# Patient Record
Sex: FEMALE | Race: WHITE | NOT HISPANIC OR LATINO | Employment: PART TIME | URBAN - METROPOLITAN AREA
[De-identification: names, ages, dates, MRNs, and addresses within clinical notes are randomized per-mention and may not be internally consistent; named-entity substitution may affect disease eponyms.]

---

## 2022-09-15 NOTE — PATIENT INSTRUCTIONS
- Maintain healthy weight with BMI ideally between 18-25     - Eat a healthy diet, including multiple servings of vegetables and fruits, as well as lean protein sources  - Get at least 150 minutes of moderate aerobic activity or 75 minutes of vigorous aerobic activity a week, or a combination of moderate and vigorous activity  Greater amounts of exercise will provide an even greater health benefit  - Ensure diet provides 1200mg of Calcium daily (divided) and 800IU of vitamin D, or take supplements to meet this  - Safe sex practices recommended  - Resources - information on birth control and sexually transmitted infections - www bedsider  org            - information on sexually transmitted infections - www cdc gov/std/     Medications for pain with intercourse   - Estrace vaginal cream   - Vagifem   - Estring    - Premarin vaginal cream

## 2022-09-16 ENCOUNTER — ANNUAL EXAM (OUTPATIENT)
Dept: OBGYN CLINIC | Facility: CLINIC | Age: 67
End: 2022-09-16

## 2022-09-16 VITALS
SYSTOLIC BLOOD PRESSURE: 100 MMHG | HEIGHT: 61 IN | WEIGHT: 117 LBS | DIASTOLIC BLOOD PRESSURE: 58 MMHG | BODY MASS INDEX: 22.09 KG/M2

## 2022-09-16 DIAGNOSIS — Z12.31 BREAST CANCER SCREENING BY MAMMOGRAM: ICD-10-CM

## 2022-09-16 DIAGNOSIS — N94.10 DYSPAREUNIA IN FEMALE: ICD-10-CM

## 2022-09-16 DIAGNOSIS — Z13.820 SCREENING FOR OSTEOPOROSIS: ICD-10-CM

## 2022-09-16 DIAGNOSIS — E28.39 ESTROGEN DEFICIENCY: ICD-10-CM

## 2022-09-16 DIAGNOSIS — N83.201 RIGHT OVARIAN CYST: ICD-10-CM

## 2022-09-16 DIAGNOSIS — Z01.419 ENCOUNTER FOR ANNUAL ROUTINE GYNECOLOGICAL EXAMINATION: Primary | ICD-10-CM

## 2022-09-16 RX ORDER — LAMOTRIGINE 100 MG/1
TABLET ORAL
COMMUNITY
Start: 2022-08-07

## 2022-09-16 NOTE — LETTER
[id number]     Richard Ponce, [address] [facility] [address]    Patient: Sonya Mayberry   YOB: 1955   Date of Visit: 2022       Dear Dr Barbour Never: Thank you for referring Sonya Mayberry to me for evaluation  Below are my notes for this consultation  If you have questions, please do not hesitate to call me  I look forward to following your patient along with you  Sincerely,        Starr Sims MD        CC: No Recipients  Starr Sims MD  2022  2:42 PM  Sign when Signing Visit  Medicare [address]  [address] [facility], [address], [place]    ASSESSMENT/PLAN: Sonya Mayberry is a 74 y o   who presents for [employer] gynecologic wellness exam     Encounter for routine gynecologic examination  - Routine well woman exam completed today  - Cervical Cancer Screening last 2020, discussed considering stopping - pt to consider   - Breast Cancer Screening: Last Mammogram 10/2019 normal  - Colorectal cancer screening last  per pt, next due   - Osteoporosis screening: last  - order provided  - The following were reviewed in today's visit: {Gyn counselin}  Discussed with patient Medicare (and plans that act like Medicare) pay for wellness visit q 2 yrs - but patient may return for problems as needed  Recommend annual breast exam and mammogram   If not seen by our office on her off year, she can still call and ask for a screening mammogram order and the nurses will provide one for her  Additional problems addressed during this visit:  1  Encounter for annual routine gynecological examination    2  Breast cancer screening by mammogram  -     Mammo screening bilateral w 3d & cad; Future    3  Screening for osteoporosis  Comments:  Encouraged patient to check insurance for coverage of dexa scan  Orders:  -     DXA bone density spine hip and pelvis; Future    4   Estrogen deficiency  -     DXA bone density spine hip and pelvis; Future    5  Right ovarian cyst  Assessment & Plan:  H/o simple ovarian cyst in past   Last noted  was stable and pt asx  She continues to have no sx and declines order to f/u imaging today  6  Dyspareunia in female  Assessment & Plan:  Long h/o pain with intercourse due to atrophy, but vaginal estrogen too expensive  Has new insurance now  Gave list of options to check against formulary and call me if wishes to start any  Next visit: 2 year Medicare Wellness      CC:  Medicare Gynecologic Wellness Examination    HPI: Cheikh Street is a 74 y o   who presents for [facility] gynecologic examination  She denies any breast, urinary or pelvic issues at today's visit  Sexually active, some discomfort with intercourse but vaginal estrogen has always been too expensive  Gyn History       She  reports being sexually active and has had partner(s) who are male  She reports using the following method of birth control/protection: Post-menopausal        Past Medical History:  2011: Seizures ([facility] [address] )      Comment:  on medication, following with neurology     Past Surgical History:  : FOOT NEUROMA SURGERY;  Right  2009: HERNIA REPAIR  1963: TONSILLECTOMY     Family History   Problem Relation Age of Onset    Substance Abuse Mother     Hypertension Father     Atrial fibrillation Father     Heart murmur Brother     Emphysema Maternal Grandmother     Heart disease Maternal Grandfather     Diabetes Maternal Grandfather     Melanoma Paternal Grandmother     Alzheimer's disease Paternal Grandfather     No Known Problems Daughter     Breast cancer Neg Hx         Social History     Tobacco Use    Smoking status: Never Smoker    Smokeless tobacco: Never Used   Vaping Use    Vaping Use: Never used   Substance Use Topics    Alcohol use: Yes     Comment: rare    Drug use: Never          Current Outpatient Medications:     Calcium Carb-Cholecalciferol 1000-800 MG-UNIT TABS, Take by mouth, Disp: , Rfl:     lamoTRIgine (LaMICtal) 100 mg tablet, TAKE ONE HALF TABLET BY MOUTH IN THE MORNING AND ONE HALF TABLET BY MOUTH IN THE EVENING , Disp: , Rfl:     She has No Known Allergies       ROS negative except as noted in HPI    Objective:  /58 (BP Location: Left arm, Patient Position: Sitting, Cuff Size: Standard)   Ht 5' 0 5" (1 537 m)   Wt 53 1 kg (117 lb)   Breastfeeding No   BMI 22 47 kg/m²      Physical Exam  Constitutional:       Appearance: Normal appearance  Chest:   Breasts:      Right: Normal  No mass, tenderness or axillary adenopathy  Left: Normal  No mass, tenderness or axillary adenopathy  Abdominal:      Palpations: Abdomen is soft  Tenderness: There is no abdominal tenderness  Genitourinary:     General: Normal vulva  Vagina: No bleeding or lesions  Cervix: Normal       Uterus: Normal  Not tender  Adnexa:         Right: No mass or tenderness  Left: No mass or tenderness  Rectum: No mass or external hemorrhoid  Normal anal tone  Comments: Atrophic changes appropriate to age  Musculoskeletal:         General: Normal range of motion  Lymphadenopathy:      Upper Body:      Right upper body: No axillary adenopathy  Left upper body: No axillary adenopathy  Neurological:      Mental Status: She is alert and oriented to person, place, and time     Psychiatric:         Mood and Affect: Mood normal          Behavior: Behavior normal

## 2022-09-16 NOTE — LETTER
[id number]     Jeff Rice, [address] [facility] [address]    Patient: Dontrell Cardozo   YOB: 1955   Date of Visit: 2022       Dear Dr Dianelys Giang: Thank you for referring Dontrell Cardozo to me for evaluation  Below are my notes for this consultation  If you have questions, please do not hesitate to call me  I look forward to following your patient along with you  Sincerely,        Rob Decker MD        CC: No Recipients  Rob Decker MD  2022  2:43 PM  Sign when Signing Visit  Medicare [address]  [address] [facility], [address], [facility], [facility]    ASSESSMENT/PLAN: Dontrell Cardozo is a 74 y o   who presents for [employer] gynecologic wellness exam     Encounter for routine gynecologic examination  - Routine well woman exam completed today  - Cervical Cancer Screening last 2020, discussed considering stopping - pt to consider   - Breast Cancer Screening: Last Mammogram 10/2019 normal  - Colorectal cancer screening last  per pt, next due   - Osteoporosis screening: last  - order provided  - The following were reviewed in today's visit: mammography screening ordered, menopause, adequate intake of calcium and vitamin D, exercise and healthy diet  Discussed with patient Medicare (and plans that act like Medicare) pay for wellness visit q 2 yrs - but patient may return for problems as needed  Recommend annual breast exam and mammogram   If not seen by our office on her off year, she can still call and ask for a screening mammogram order and the nurses will provide one for her  Additional problems addressed during this visit:  1  Encounter for annual routine gynecological examination    2  Breast cancer screening by mammogram  -     Mammo screening bilateral w 3d & cad; Future    3  Screening for osteoporosis  Comments:  Encouraged patient to check insurance for coverage of dexa scan    Orders:  - DXA bone density spine hip and pelvis; Future    4  Estrogen deficiency  -     DXA bone density spine hip and pelvis; Future    5  Right ovarian cyst  Assessment & Plan:  H/o simple ovarian cyst in past   Last noted  was stable and pt asx  She continues to have no sx and declines order to f/u imaging today  6  Dyspareunia in female  Assessment & Plan:  Long h/o pain with intercourse due to atrophy, but vaginal estrogen too expensive  Has new insurance now  Gave list of options to check against formulary and call me if wishes to start any  Next visit: 2 year Medicare Wellness      CC:  Medicare Gynecologic Wellness Examination    HPI: Sonya Mayberry is a 74 y o   who presents for [facility] gynecologic examination  She denies any breast, urinary or pelvic issues at today's visit  Sexually active, some discomfort with intercourse but vaginal estrogen has always been too expensive  Gyn History       She  reports being sexually active and has had partner(s) who are male  She reports using the following method of birth control/protection: Post-menopausal        Past Medical History:  2011: Seizures ([facility] [address] )      Comment:  on medication, following with neurology     Past Surgical History:  : FOOT NEUROMA SURGERY;  Right  2009: HERNIA REPAIR  1963: TONSILLECTOMY     Family History   Problem Relation Age of Onset    Substance Abuse Mother     Hypertension Father     Atrial fibrillation Father     Heart murmur Brother     Emphysema Maternal Grandmother     Heart disease Maternal Grandfather     Diabetes Maternal Grandfather     Melanoma Paternal Grandmother     Alzheimer's disease Paternal Grandfather     No Known Problems Daughter     Breast cancer Neg Hx         Social History     Tobacco Use    Smoking status: Never Smoker    Smokeless tobacco: Never Used   Vaping Use    Vaping Use: Never used   Substance Use Topics    Alcohol use: Yes     Comment: rare    Drug use: Never Current Outpatient Medications:     Calcium Carb-Cholecalciferol 1000-800 MG-UNIT TABS, Take by mouth, Disp: , Rfl:     lamoTRIgine (LaMICtal) 100 mg tablet, TAKE ONE HALF TABLET BY MOUTH IN THE MORNING AND ONE HALF TABLET BY MOUTH IN THE EVENING , Disp: , Rfl:     She has No Known Allergies       ROS negative except as noted in HPI    Objective:  /58 (BP Location: Left arm, Patient Position: Sitting, Cuff Size: Standard)   Ht 5' 0 5" (1 537 m)   Wt 53 1 kg (117 lb)   Breastfeeding No   BMI 22 47 kg/m²      Physical Exam  Constitutional:       Appearance: Normal appearance  Chest:   Breasts:      Right: Normal  No mass, tenderness or axillary adenopathy  Left: Normal  No mass, tenderness or axillary adenopathy  Abdominal:      Palpations: Abdomen is soft  Tenderness: There is no abdominal tenderness  Genitourinary:     General: Normal vulva  Vagina: No bleeding or lesions  Cervix: Normal       Uterus: Normal  Not tender  Adnexa:         Right: No mass or tenderness  Left: No mass or tenderness  Rectum: No mass or external hemorrhoid  Normal anal tone  Comments: Atrophic changes appropriate to age  Musculoskeletal:         General: Normal range of motion  Lymphadenopathy:      Upper Body:      Right upper body: No axillary adenopathy  Left upper body: No axillary adenopathy  Neurological:      Mental Status: She is alert and oriented to person, place, and time     Psychiatric:         Mood and Affect: Mood normal          Behavior: Behavior normal

## 2022-09-16 NOTE — ASSESSMENT & PLAN NOTE
H/o simple ovarian cyst in past   Last noted 2015 was stable and pt asx  She continues to have no sx and declines order to f/u imaging today

## 2022-09-16 NOTE — ASSESSMENT & PLAN NOTE
Long h/o pain with intercourse due to atrophy, but vaginal estrogen too expensive  Has new insurance now  Gave list of options to check against formulary and call me if wishes to start any

## 2022-09-16 NOTE — PROGRESS NOTES
Medicare [address] [facility]  [address], [address], [place], [place] [address]    ASSESSMENT/PLAN: Anali Gilmore is a 74 y o   who presents for [facility] gynecologic wellness exam     Encounter for routine gynecologic examination  - Routine well woman exam completed today  - Cervical Cancer Screening last 2020, discussed considering stopping - pt to consider   - Breast Cancer Screening: Last Mammogram 10/2019 normal  - Colorectal cancer screening last  per pt, next due   - Osteoporosis screening: last  - order provided  - The following were reviewed in today's visit: mammography screening ordered, menopause, adequate intake of calcium and vitamin D, exercise and healthy diet  Discussed with patient Medicare (and plans that act like Medicare) pay for wellness visit q 2 yrs - but patient may return for problems as needed  Recommend annual breast exam and mammogram   If not seen by our office on her off year, she can still call and ask for a screening mammogram order and the nurses will provide one for her  Additional problems addressed during this visit:  1  Encounter for annual routine gynecological examination    2  Breast cancer screening by mammogram  -     Mammo screening bilateral w 3d & cad; Future    3  Screening for osteoporosis  Comments:  Encouraged patient to check insurance for coverage of dexa scan  Orders:  -     DXA bone density spine hip and pelvis; Future    4  Estrogen deficiency  -     DXA bone density spine hip and pelvis; Future    5  Right ovarian cyst  Assessment & Plan:  H/o simple ovarian cyst in past   Last noted  was stable and pt asx  She continues to have no sx and declines order to f/u imaging today  6  Dyspareunia in female  Assessment & Plan:  Long h/o pain with intercourse due to atrophy, but vaginal estrogen too expensive  Has new insurance now    Gave list of options to check against formulary and call me if wishes to start any  Next visit: 2 year Medicare Wellness      CC:  Medicare Gynecologic Wellness Examination    HPI: Gerri Medina is a 74 y o   who presents for [facility] gynecologic examination  She denies any breast, urinary or pelvic issues at today's visit  Sexually active, some discomfort with intercourse but vaginal estrogen has always been too expensive  Gyn History       She  reports being sexually active and has had partner(s) who are male  She reports using the following method of birth control/protection: Post-menopausal        Past Medical History:  2011: Seizures ([address] )      Comment:  on medication, following with neurology     Past Surgical History:  1989: FOOT NEUROMA SURGERY; Right  2009: HERNIA REPAIR  1963: TONSILLECTOMY     Family History   Problem Relation Age of Onset    Substance Abuse Mother     Hypertension Father     Atrial fibrillation Father     Heart murmur Brother     Emphysema Maternal Grandmother     Heart disease Maternal Grandfather     Diabetes Maternal Grandfather     Melanoma Paternal Grandmother     Alzheimer's disease Paternal Grandfather     No Known Problems Daughter     Breast cancer Neg Hx         Social History     Tobacco Use    Smoking status: Never Smoker    Smokeless tobacco: Never Used   Vaping Use    Vaping Use: Never used   Substance Use Topics    Alcohol use: Yes     Comment: rare    Drug use: Never          Current Outpatient Medications:     Calcium Carb-Cholecalciferol 1000-800 MG-UNIT TABS, Take by mouth, Disp: , Rfl:     lamoTRIgine (LaMICtal) 100 mg tablet, TAKE ONE HALF TABLET BY MOUTH IN THE MORNING AND ONE HALF TABLET BY MOUTH IN THE EVENING , Disp: , Rfl:     She has No Known Allergies       ROS negative except as noted in HPI    Objective:  /58 (BP Location: Left arm, Patient Position: Sitting, Cuff Size: Standard)   Ht 5' 0 5" (1 537 m)   Wt 53 1 kg (117 lb)   Breastfeeding No   BMI 22 47 kg/m²      Physical Exam  Constitutional:       Appearance: Normal appearance  Chest:   Breasts:      Right: Normal  No mass, tenderness or axillary adenopathy  Left: Normal  No mass, tenderness or axillary adenopathy  Abdominal:      Palpations: Abdomen is soft  Tenderness: There is no abdominal tenderness  Genitourinary:     General: Normal vulva  Vagina: No bleeding or lesions  Cervix: Normal       Uterus: Normal  Not tender  Adnexa:         Right: No mass or tenderness  Left: No mass or tenderness  Rectum: No mass or external hemorrhoid  Normal anal tone  Comments: Atrophic changes appropriate to age  Musculoskeletal:         General: Normal range of motion  Lymphadenopathy:      Upper Body:      Right upper body: No axillary adenopathy  Left upper body: No axillary adenopathy  Neurological:      Mental Status: She is alert and oriented to person, place, and time     Psychiatric:         Mood and Affect: Mood normal          Behavior: Behavior normal

## 2023-05-18 DIAGNOSIS — E28.39 ESTROGEN DEFICIENCY: ICD-10-CM

## 2023-05-18 DIAGNOSIS — Z13.820 SCREENING FOR OSTEOPOROSIS: ICD-10-CM

## 2023-05-18 DIAGNOSIS — M81.0 AGE-RELATED OSTEOPOROSIS WITHOUT CURRENT PATHOLOGICAL FRACTURE: Primary | ICD-10-CM

## 2023-05-19 PROBLEM — M81.0 AGE-RELATED OSTEOPOROSIS WITHOUT CURRENT PATHOLOGICAL FRACTURE: Status: ACTIVE | Noted: 2023-05-19

## 2023-05-26 DIAGNOSIS — Z12.31 BREAST CANCER SCREENING BY MAMMOGRAM: ICD-10-CM

## 2023-06-22 LAB
1,25(OH)2D3 SERPL-MCNC: 63.1 PG/ML (ref 24.8–81.5)
CALCIUM SERPL-MCNC: 9.5 MG/DL (ref 8.7–10.3)
CREAT SERPL-MCNC: 0.93 MG/DL (ref 0.57–1)
EGFR: 68 ML/MIN/1.73
PTH-INTACT SERPL-MCNC: 39 PG/ML (ref 15–65)

## 2023-07-20 NOTE — PROGRESS NOTES
[address]  [address] [facility], [address], [place], [place] [address]    Assessment/Plan:  1. Age-related osteoporosis without current pathological fracture  Assessment & Plan:  Reviewed w/ pt dexa results, normal bone changes/remodeling, role of calcium and vitamin D in bone health and her significant risk of fracture given dexa scan results. I recommend treatment with medication beyond supplement due to severity of risk. We discussed risks/benefits/alternatives and options, I recommend starting bisphosphonate treatment. Offered referral to Rheumatologist if wishes another opinion and alternative medications. Recom 1200mg Ca and 800IU Vit D daily, via diet or supplements, and exercise bearing exercise. After review and questions answered - Sourav Anderson took script for Fosamax and will consider starting. If she does I recommend f/u 1 year. If she does not - strongly recom Ca, Vit D, exercise and f/u 2024 and will recom repeat Dexa. Orders:  -     alendronate (FOSAMAX) 70 mg tablet; Take 1 tablet (70 mg total) by mouth every 7 days    I have spent a total time of 35 minutes on 23 in caring for this patient including Diagnostic results, Prognosis, Risks and benefits of tx options, Patient and family education, Impressions, Documenting in the medical record, Reviewing / ordering tests, medicine, procedures   and Obtaining or reviewing history  . Subjective:   Lani Mar is a 74 y.o.  female. CC: here to discuss results      HPI:   Sourav Anderson presents to discuss results of her recent dexa scan and labs. 2023 dexa (compared to ) - spine osteoporosis T score -2.6 (19% decrease from 2008); left hip T score -1.9 (17% decrease from 2008), left femoral neck T score -2. 4. Her only prior dexa scan was from .    2023 labs normal and recom f/u in office to discuss. Today she reports taking Vit D and approximately 900mg Ca daily in supplements. Maybe some in her diet. She states she is making more of an effort to be sure she takes the Calcium daily. Gyn History  No LMP recorded. Patient is postmenopausal.       She  reports being sexually active and has had partner(s) who are male. She reports using the following method of birth control/protection: Post-menopausal.       OB History      Past Medical History:  : Osteoporosis  2011: Seizures ([address])      Comment:  on medication, following with neurology     Past Surgical History:  : [address]; Right  2009: HERNIA REPAIR  1963: TONSILLECTOMY     Social History     Tobacco Use   • Smoking status: Never   • Smokeless tobacco: Never   Vaping Use   • Vaping Use: Never used   Substance Use Topics   • Alcohol use: Yes     Comment: rare   • Drug use: Never          Current Outpatient Medications:   •  alendronate (FOSAMAX) 70 mg tablet, Take 1 tablet (70 mg total) by mouth every 7 days, Disp: 12 tablet, Rfl: 4  •  Calcium Carb-Cholecalciferol 1000-800 MG-UNIT TABS, Take by mouth, Disp: , Rfl:   •  lamoTRIgine (LaMICtal) 25 mg tablet, 2 tablets by mouth every day in the morning and 1 at bedtime. , Disp: , Rfl:     She has No Known Allergies. .    ROS: Review of Systems   Constitutional: Negative. Gastrointestinal: Negative. Genitourinary: Negative. Psychiatric/Behavioral: Negative. Objective:  /62   Ht 5' 1" (1.549 m)   Wt 52.3 kg (115 lb 3.2 oz)   Breastfeeding No   BMI 21.77 kg/m²      Physical Exam  Constitutional:       Appearance: Normal appearance. Neurological:      Mental Status: She is alert and oriented to person, place, and time.    Psychiatric:         Behavior: Behavior normal.

## 2023-07-21 ENCOUNTER — OFFICE VISIT (OUTPATIENT)
Dept: OBGYN CLINIC | Facility: CLINIC | Age: 68
End: 2023-07-21
Payer: MEDICARE

## 2023-07-21 VITALS
BODY MASS INDEX: 21.75 KG/M2 | WEIGHT: 115.2 LBS | DIASTOLIC BLOOD PRESSURE: 62 MMHG | SYSTOLIC BLOOD PRESSURE: 110 MMHG | HEIGHT: 61 IN

## 2023-07-21 DIAGNOSIS — M81.0 AGE-RELATED OSTEOPOROSIS WITHOUT CURRENT PATHOLOGICAL FRACTURE: Primary | ICD-10-CM

## 2023-07-21 PROBLEM — G40.109 LOCALIZATION-RELATED (FOCAL) (PARTIAL) SYMPTOMATIC EPILEPSY AND EPILEPTIC SYNDROMES WITH SIMPLE PARTIAL SEIZURES, NOT INTRACTABLE, WITHOUT STATUS EPILEPTICUS (HCC): Status: ACTIVE | Noted: 2023-07-21

## 2023-07-21 PROCEDURE — 99214 OFFICE O/P EST MOD 30 MIN: CPT | Performed by: OBSTETRICS & GYNECOLOGY

## 2023-07-21 RX ORDER — LAMOTRIGINE 25 MG/1
TABLET ORAL
COMMUNITY
Start: 2023-06-18

## 2023-07-21 RX ORDER — ALENDRONATE SODIUM 70 MG/1
70 TABLET ORAL
Qty: 12 TABLET | Refills: 4 | Status: SHIPPED | OUTPATIENT
Start: 2023-07-21

## 2023-07-21 NOTE — ASSESSMENT & PLAN NOTE
Reviewed w/ pt dexa results, normal bone changes/remodeling, role of calcium and vitamin D in bone health and her significant risk of fracture given dexa scan results. I recommend treatment with medication beyond supplement due to severity of risk. We discussed risks/benefits/alternatives and options, I recommend starting bisphosphonate treatment. Offered referral to Rheumatologist if wishes another opinion and alternative medications. Recom 1200mg Ca and 800IU Vit D daily, via diet or supplements, and exercise bearing exercise. After review and questions answered - Lamar Bearden took script for Fosamax and will consider starting. If she does I recommend f/u 1 year. If she does not - strongly recom Ca, Vit D, exercise and f/u 9/2024 and will recom repeat Dexa.